# Patient Record
Sex: MALE | Race: WHITE | NOT HISPANIC OR LATINO | URBAN - METROPOLITAN AREA
[De-identification: names, ages, dates, MRNs, and addresses within clinical notes are randomized per-mention and may not be internally consistent; named-entity substitution may affect disease eponyms.]

---

## 2021-03-07 ENCOUNTER — HOSPITAL ENCOUNTER (OUTPATIENT)
Dept: URGENT CARE | Facility: CLINIC | Age: 46
Discharge: HOME | End: 2021-03-07
Attending: NURSE PRACTITIONER

## 2025-01-10 ENCOUNTER — HOSPITAL ENCOUNTER (OUTPATIENT)
Dept: CT IMAGING | Facility: HOSPITAL | Age: 50
Discharge: HOME | End: 2025-01-10
Admitting: ORTHOPAEDIC SURGERY
Payer: COMMERCIAL

## 2025-01-10 ENCOUNTER — OFFICE VISIT (OUTPATIENT)
Dept: ORTHOPEDIC SURGERY | Facility: CLINIC | Age: 50
End: 2025-01-10
Payer: COMMERCIAL

## 2025-01-10 VITALS
WEIGHT: 239 LBS | HEIGHT: 67 IN | DIASTOLIC BLOOD PRESSURE: 114 MMHG | HEART RATE: 70 BPM | SYSTOLIC BLOOD PRESSURE: 171 MMHG | OXYGEN SATURATION: 97 % | BODY MASS INDEX: 37.51 KG/M2

## 2025-01-10 DIAGNOSIS — S52.042A DISPLACED FRACTURE OF CORONOID PROCESS OF LEFT ULNA, INITIAL ENCOUNTER FOR CLOSED FRACTURE: Primary | ICD-10-CM

## 2025-01-10 DIAGNOSIS — S52.042A DISPLACED FRACTURE OF CORONOID PROCESS OF LEFT ULNA, INITIAL ENCOUNTER FOR CLOSED FRACTURE: ICD-10-CM

## 2025-01-10 PROCEDURE — 73200 CT UPPER EXTREMITY W/O DYE: CPT

## 2025-01-10 RX ORDER — HYDROCODONE BITARTRATE AND ACETAMINOPHEN 7.5; 325 MG/1; MG/1
1 TABLET ORAL EVERY 6 HOURS PRN
Qty: 28 TABLET | Refills: 0 | Status: SHIPPED | OUTPATIENT
Start: 2025-01-10

## 2025-01-10 NOTE — PROGRESS NOTES
"Chief Complaint  Initial Evaluation of the Left Elbow       Subjective      Stanislav Herrera presents to [facility] for an evaluation  of his left elbow. He slipped on ice where he landed on his left elbow. He went to urgent care on 01/08/24 where he had x-rays and placed into a splint and sling. He denies any prior surgery to his left elbow.     No Known Allergies     Social History     Socioeconomic History    Marital status: Single   Tobacco Use    Smoking status: Never    Smokeless tobacco: Never   Vaping Use    Vaping status: Never Used   Substance and Sexual Activity    Alcohol use: Yes     Comment: occ/ once per week    Drug use: Never        I reviewed the patient's chief complaint, history of present illness, review of systems, past medical history, surgical history, family history, social history, medications, and allergy list.     Review of Systems     Constitutional: Denies fevers, chills, weight loss  Cardiovascular: Denies chest pain, shortness of breath  Skin: Denies rashes, acute skin changes  Neurologic: Denies headache, loss of consciousness  MSK: left elbow pain       Vital Signs:   BP (!) 171/114   Pulse 70   Ht 170.2 cm (67.01\")   Wt 108 kg (239 lb)   SpO2 97%   BMI 37.42 kg/m²            Ortho Exam    Physical Exam  General:Alert. No acute distress     Left upper extremity: long arm splint is clean, dry and intact, able to wiggle his fingers, mild swelling, neurovascularly intact, positive  pulses, sensation intact to the medial, radial and ulnar nerve.     Procedures    Imaging Results (Most Recent)       None             Result Review :       XR Elbow 3+ View Left    Result Date: 1/8/2025  Narrative: XR ELBOW 3+ VW LEFT Date of Exam: 1/8/2025 6:45 PM EST Indication: slipped on ice and landed on elbow, pain lateral epicondyle and olceranon process with soft tissue swelling, unable to extend or pronate/supinate Comparison: None available Findings: There is an " acute mildly displaced fracture involving the lateral epicondyle. There is also a mildly displaced anterior fracture involving the coronoid process of the ulna. There is an elbow joint effusion. There is no dislocation.     Impression: Impression: 1.Acute mildly displaced fracture involving the lateral epicondyle. 2.Acute mildly displaced fracture involving the anterior coronoid process of the ulna. 3.Anterior elbow joint effusion. Electronically Signed: Stan Reyeselor  1/8/2025 6:56 PM EST  Workstation ID: [id number]            Assessment and Plan     Diagnoses and all orders for this visit:    1. Displaced fracture of coronoid process of left ulna, initial encounter for closed fracture (Primary)      The patient presents here today for an evaluation  of his left elbow.     STAT CT scan order placed toady and a STAT referral placed for Dr. Silver for possible operative treatment.     Norco sent into the pharmacy today for pain control.     Call or return if worsening symptoms.    Follow Up     PRN     Patient was given instructions and counseling regarding his condition or for health maintenance advice. Please see specific information pulled into the AVS if appropriate.     Scribed for Jefe Carter MD by Natalie Maldonado.  01/10/25   08:06 EST    I have personally performed the services described in this document as scribed by the above individual and it is both accurate and complete. Jefe Carter MD 01/12/25